# Patient Record
Sex: MALE | Race: WHITE | Employment: UNEMPLOYED | URBAN - METROPOLITAN AREA
[De-identification: names, ages, dates, MRNs, and addresses within clinical notes are randomized per-mention and may not be internally consistent; named-entity substitution may affect disease eponyms.]

---

## 2021-07-16 ENCOUNTER — HOSPITAL ENCOUNTER (EMERGENCY)
Age: 31
Discharge: HOME | End: 2021-07-16
Attending: EMERGENCY MEDICINE
Payer: COMMERCIAL

## 2021-07-16 ENCOUNTER — APPOINTMENT (OUTPATIENT)
Dept: GENERAL RADIOLOGY | Age: 31
End: 2021-07-16
Payer: COMMERCIAL

## 2021-07-16 ENCOUNTER — APPOINTMENT (OUTPATIENT)
Dept: CT IMAGING | Age: 31
End: 2021-07-16
Payer: COMMERCIAL

## 2021-07-16 VITALS
WEIGHT: 203 LBS | DIASTOLIC BLOOD PRESSURE: 96 MMHG | HEIGHT: 72 IN | TEMPERATURE: 97.8 F | OXYGEN SATURATION: 100 % | BODY MASS INDEX: 27.5 KG/M2 | RESPIRATION RATE: 18 BRPM | SYSTOLIC BLOOD PRESSURE: 132 MMHG | HEART RATE: 75 BPM

## 2021-07-16 DIAGNOSIS — S09.90XA INJURY OF HEAD, INITIAL ENCOUNTER: Primary | ICD-10-CM

## 2021-07-16 LAB
A/G RATIO: 1.8 (ref 1.1–2.2)
ALBUMIN SERPL-MCNC: 4.6 G/DL (ref 3.4–5)
ALP BLD-CCNC: 66 U/L (ref 40–129)
ALT SERPL-CCNC: 19 U/L (ref 10–40)
ANION GAP SERPL CALCULATED.3IONS-SCNC: 10 MMOL/L (ref 3–16)
AST SERPL-CCNC: 16 U/L (ref 15–37)
BASOPHILS ABSOLUTE: 0.1 K/UL (ref 0–0.2)
BASOPHILS RELATIVE PERCENT: 1.6 %
BILIRUB SERPL-MCNC: 0.5 MG/DL (ref 0–1)
BUN BLDV-MCNC: 18 MG/DL (ref 7–20)
CALCIUM SERPL-MCNC: 9.4 MG/DL (ref 8.3–10.6)
CHLORIDE BLD-SCNC: 104 MMOL/L (ref 99–110)
CO2: 24 MMOL/L (ref 21–32)
CREAT SERPL-MCNC: 0.8 MG/DL (ref 0.9–1.3)
EKG ATRIAL RATE: 75 BPM
EKG DIAGNOSIS: NORMAL
EKG P AXIS: 56 DEGREES
EKG P-R INTERVAL: 164 MS
EKG Q-T INTERVAL: 398 MS
EKG QRS DURATION: 94 MS
EKG QTC CALCULATION (BAZETT): 444 MS
EKG R AXIS: 60 DEGREES
EKG T AXIS: 55 DEGREES
EKG VENTRICULAR RATE: 75 BPM
EOSINOPHILS ABSOLUTE: 0.2 K/UL (ref 0–0.6)
EOSINOPHILS RELATIVE PERCENT: 2.7 %
GFR AFRICAN AMERICAN: >60
GFR NON-AFRICAN AMERICAN: >60
GLOBULIN: 2.6 G/DL
GLUCOSE BLD-MCNC: 110 MG/DL (ref 70–99)
HCT VFR BLD CALC: 47.3 % (ref 40.5–52.5)
HEMOGLOBIN: 16.6 G/DL (ref 13.5–17.5)
LYMPHOCYTES ABSOLUTE: 2 K/UL (ref 1–5.1)
LYMPHOCYTES RELATIVE PERCENT: 28 %
MCH RBC QN AUTO: 32.1 PG (ref 26–34)
MCHC RBC AUTO-ENTMCNC: 35.1 G/DL (ref 31–36)
MCV RBC AUTO: 91.6 FL (ref 80–100)
MONOCYTES ABSOLUTE: 0.5 K/UL (ref 0–1.3)
MONOCYTES RELATIVE PERCENT: 7.2 %
NEUTROPHILS ABSOLUTE: 4.3 K/UL (ref 1.7–7.7)
NEUTROPHILS RELATIVE PERCENT: 60.5 %
PDW BLD-RTO: 12.3 % (ref 12.4–15.4)
PLATELET # BLD: 240 K/UL (ref 135–450)
PMV BLD AUTO: 7.7 FL (ref 5–10.5)
POTASSIUM SERPL-SCNC: 3.8 MMOL/L (ref 3.5–5.1)
RBC # BLD: 5.16 M/UL (ref 4.2–5.9)
SODIUM BLD-SCNC: 138 MMOL/L (ref 136–145)
TOTAL PROTEIN: 7.2 G/DL (ref 6.4–8.2)
TROPONIN: <0.01 NG/ML
WBC # BLD: 7.2 K/UL (ref 4–11)

## 2021-07-16 PROCEDURE — 93010 ELECTROCARDIOGRAM REPORT: CPT | Performed by: INTERNAL MEDICINE

## 2021-07-16 PROCEDURE — 93005 ELECTROCARDIOGRAM TRACING: CPT | Performed by: EMERGENCY MEDICINE

## 2021-07-16 PROCEDURE — 99282 EMERGENCY DEPT VISIT SF MDM: CPT

## 2021-07-16 PROCEDURE — 71045 X-RAY EXAM CHEST 1 VIEW: CPT

## 2021-07-16 PROCEDURE — 85025 COMPLETE CBC W/AUTO DIFF WBC: CPT

## 2021-07-16 PROCEDURE — 84484 ASSAY OF TROPONIN QUANT: CPT

## 2021-07-16 PROCEDURE — 80053 COMPREHEN METABOLIC PANEL: CPT

## 2021-07-16 PROCEDURE — 70450 CT HEAD/BRAIN W/O DYE: CPT

## 2021-07-16 ASSESSMENT — PAIN DESCRIPTION - LOCATION: LOCATION: HEAD

## 2021-07-16 ASSESSMENT — PAIN SCALES - GENERAL: PAINLEVEL_OUTOF10: 5

## 2021-07-16 NOTE — ED PROVIDER NOTES
[address] ED      CHIEF COMPLAINT  Head Injury (Pt fell out of a hammock about an hour ago and hit his head on a tree. Pt has dried blood on the top of his head. States he had LOC that lasted about 30 seconds. ambulated to room without issue. )       HISTORY OF PRESENT ILLNESS  Forrest Tinajero is a 32 y.o. male with no past medical history presents to emergency department for evaluation of head injury. Patient reports he fell asleep on a hammock and the knot must have come untied and he fell on the ground. Says he woke up and saw that he had landed on the ground. When he stood back up, says he lost consciousness and fell backwards and hit his head again. He does have a scalp laceration which he thinks may have been from the [address] [place] [address] Road. Reports having history of seizures. However, says that he is not on any medications. The significant other at bedside says patient was answering questions immediately after the incident. Denies taking anticoagulants or aspirin. Denies having pain anywhere else. Denies having lightheadedness, chest pain, shortness of breath. No other complaints, modifying factors or associated symptoms. I have reviewed the following from the nursing documentation. Past Medical History:   Diagnosis Date    Chronic back pain      No past surgical history on file. No family history on file. Social History     Socioeconomic History    Marital status: Legally      Spouse name: Not on file    Number of children: Not on file    Years of education: Not on file    Highest education level: Not on file   Occupational History    Not on file   Tobacco Use    Smoking status: Current Every Day Smoker     Packs/day: 1.00     Years: 8.00     Pack years: 8.00     Types: Cigarettes     Last attempt to quit: 6/15/2012     Years since quittin.0    Smokeless tobacco: Former User     Types: Snuff   Substance and Sexual Activity    Alcohol use: No    Drug use:  No  Sexual activity: Not on file   Other Topics Concern    Not on file   Social History Narrative    Not on file     Social Determinants of Health     Financial Resource Strain:     Difficulty of Paying Living Expenses:    Food Insecurity:     Worried About Running Out of Food in the Last Year:     [address] in the Last Year:    Transportation Needs:     Lack of Transportation (Medical):  Lack of Transportation (Non-Medical):    Physical Activity:     Days of Exercise per Week:     Minutes of Exercise per Session:    Stress:     Feeling of Stress :    Social Connections:     Frequency of Communication with Friends and Family:     Frequency of Social Gatherings with Friends and Family:     Attends [employer] Services:     Active Member of Clubs or Organizations:     Attends Club or Organization Meetings:     Marital Status:    Intimate Partner Violence:     Fear of Current or Ex-Partner:     Emotionally Abused:     Physically Abused:     Sexually Abused:      Current Facility-Administered Medications   Medication Dose Route Frequency Provider Last Rate Last Admin    Tetanus-Diphth-Acell Pertussis (BOOSTRIX) injection 0.5 mL  0.5 mL Intramuscular Once Marybeth Guzman MD         No current outpatient medications on file. No Known Allergies    REVIEW OF SYSTEMS  10 systems reviewed, pertinent positives per HPI otherwise noted to be negative. PHYSICAL EXAM  /85   Pulse 63   Temp 97.8 °F (36.6 °C) (Oral)   Resp 16   Ht 6' (1.829 m)   Wt 203 lb (92.1 kg)   SpO2 99%   BMI 27.53 kg/m²    GENERAL APPEARANCE: Awake and alert. No acute distress. HENT: Normocephalic.palpable scalp hematoma. No active bleeding. dried blood present. PERRL. EOMI. No facial droop. HEART/CHEST: RRR. LUNGS: Respirations unlabored. Speaking comfortably in full sentences. ABDOMEN: Soft, non-distended abdomen. Non tender to palpation. No guarding. No rebound. EXTREMITIES: No gross deformities.  Moving Diagnosis       Normal sinus rhythmNormal ECGNo previous ECGs availableConfirmed by Jersey Osborn MD, [address] [place] [address] ([id number]) on 7/16/2021 7:36:01 AM       I have reviewed all labs for this visit. The Ekg interpreted by me shows  normal sinus rhythm with a rate of 75  Axis is   Normal  QTc is  normal  Intervals and Durations are unremarkable. ST Segments: nonspecific changes    RADIOLOGY  CT HEAD WO CONTRAST    Result Date: 7/16/2021  EXAMINATION: CT OF THE HEAD WITHOUT CONTRAST  7/16/2021 4:21 am TECHNIQUE: CT of the head was performed without the administration of intravenous contrast. Dose modulation, iterative reconstruction, and/or weight based adjustment of the mA/kV was utilized to reduce the radiation dose to as low as reasonably achievable. COMPARISON: None. HISTORY: ORDERING SYSTEM PROVIDED HISTORY: fall. inj to head TECHNOLOGIST PROVIDED HISTORY: Reason for exam:->fall. inj to head Has a \"code stroke\" or \"stroke alert\" been called? ->No Decision Support Exception - unselect if not a suspected or confirmed emergency medical condition->Emergency Medical Condition (MA) Reason for Exam: fall Acuity: Acute Type of Exam: Initial FINDINGS: BRAIN/VENTRICLES: There is no acute intracranial hemorrhage, mass effect or midline shift. No abnormal extra-axial fluid collection. The gray-white differentiation is maintained without evidence of an acute infarct. There is no evidence of hydrocephalus. ORBITS: The visualized portion of the orbits demonstrate no acute abnormality. SINUSES: The visualized paranasal sinuses and mastoid air cells demonstrate no acute abnormality. SOFT TISSUES/SKULL:  No acute abnormality of the visualized skull or soft tissues. No acute intracranial abnormality. XR CHEST PORTABLE    Result Date: 7/16/2021  EXAMINATION: ONE XRAY VIEW OF THE CHEST 7/16/2021 4:21 am COMPARISON: None.  HISTORY: ORDERING SYSTEM PROVIDED HISTORY: other TECHNOLOGIST PROVIDED HISTORY: Reason for exam:->other Reason for Exam: fall head injury Acuity: Acute Type of Exam: Initial FINDINGS: No lines or tubes. Normal cardiomediastinal silhouette. The lungs are clear without focal consolidation or pleural effusion. No suspicious pulmonary nodules. No pulmonary edema. No pneumothorax. No acute osseous abnormality. No acute cardiopulmonary disease. ED COURSE/MDM  Patient seen and evaluated. At presentation, patient was awake, alert, afebrile, hemodynamically stable, and satting well on room air. Patient was answering questions appropriately, following commands, moving all extremities. There was dried blood and a small palpable scalp hematoma. Patient given Tdap in the emergency department. Basic labs obtained as patient does not know why he fell. CT head obtained to assess for intracranial bleed such as a epidural hematoma. CT shows no acute intracranial bleed. Creatinine is normal.  Electrolytes within normal limits. Hemoglobin normal.  Troponin negative. CT showed no acute intracranial bleed. These were discussed with patient. Wound was irrigated. There was a less than 1 cm nongaping laceration. No active bleeding. It did not require closure. Patient discharged home with strict return precautions. Pt was seen during the [place] 19 pandemic. Appropriate PPE worn by ME during patient encounters. Pt seen during a time with constrained hospital bed capacity and other potential inpatient and outpatient resources were constrained due to the viral pandemic. During the patient's ED course, the patient was given:  Medications   Tetanus-Diphth-Acell Pertussis (BOOSTRIX) injection 0.5 mL (has no administration in time range)        CLINICAL IMPRESSION  1. Injury of head, initial encounter        Blood pressure 122/85, pulse 63, temperature 97.8 °F (36.6 °C), temperature source Oral, resp. rate 16, height 6' (1.829 m), weight 203 lb (92.1 kg), SpO2 99 %.     Abdi Davis was discharged home in stable condition. Patient was given scripts for the following medications. I counseled patient how to take these medications. New Prescriptions    No medications on file       Follow-up with:  Jorge L Robb MD  [address] [phone]    In 2 days        DISCLAIMER: This chart was created using Dragon dictation software. Efforts were made by me to ensure accuracy, however some errors may be present due to limitations of this technology and occasionally words are not transcribed correctly.         Jarvis Spaulding MD  07/18/21 [id number]